# Patient Record
Sex: FEMALE | Race: WHITE | Employment: OTHER | URBAN - METROPOLITAN AREA
[De-identification: names, ages, dates, MRNs, and addresses within clinical notes are randomized per-mention and may not be internally consistent; named-entity substitution may affect disease eponyms.]

---

## 2023-03-15 LAB — MAMMOGRAPHY, EXTERNAL: NORMAL

## 2023-10-23 ENCOUNTER — TELEPHONE (OUTPATIENT)
Age: 78
End: 2023-10-23

## 2023-10-24 RX ORDER — LISINOPRIL 20 MG/1
20 TABLET ORAL DAILY
COMMUNITY
Start: 2023-08-24

## 2023-10-24 RX ORDER — SIMVASTATIN 20 MG
TABLET ORAL
COMMUNITY
Start: 2023-09-06

## 2023-10-24 RX ORDER — OMEPRAZOLE 20 MG/1
CAPSULE, DELAYED RELEASE ORAL
COMMUNITY
Start: 2023-07-27 | End: 2023-10-25 | Stop reason: SDUPTHER

## 2023-10-24 RX ORDER — HYDROCHLOROTHIAZIDE 12.5 MG/1
TABLET ORAL
COMMUNITY
Start: 2023-09-06

## 2023-10-24 NOTE — TELEPHONE ENCOUNTER
Maxx Mckinney is calling to request a refill on the following medication(s):    Last Visit Date (If Applicable):  Visit date not found    Next Visit Date:    1/3/2024    Medication Request:  Requested Prescriptions     Pending Prescriptions Disp Refills    omeprazole (PRILOSEC) 20 MG delayed release capsule 90 capsule 3     Sig: TAKE 1 CAPSULE BY MOUTH ONCE DAILY 30 MINUTES BEFORE MORNING MEAL

## 2023-10-25 RX ORDER — OMEPRAZOLE 20 MG/1
CAPSULE, DELAYED RELEASE ORAL
Qty: 90 CAPSULE | Refills: 3 | Status: SHIPPED | OUTPATIENT
Start: 2023-10-25

## 2023-11-16 NOTE — TELEPHONE ENCOUNTER
Gege Hirsch is calling to request a refill on the following medication(s):    Last Visit Date (If Applicable):  Visit date not found    Next Visit Date:    1/3/2024    Medication Request:  Requested Prescriptions     Pending Prescriptions Disp Refills    simvastatin (ZOCOR) 20 MG tablet [Pharmacy Med Name: Simvastatin 20 MG Oral Tablet] 100 tablet 2     Sig: TAKE 1 TABLET BY MOUTH ONCE  DAILY    hydroCHLOROthiazide (HYDRODIURIL) 12.5 MG tablet [Pharmacy Med Name: hydroCHLOROthiazide 12.5 MG Oral Tablet] 100 tablet 2     Sig: TAKE 1 TABLET BY MOUTH ONCE  DAILY

## 2023-11-19 RX ORDER — HYDROCHLOROTHIAZIDE 12.5 MG/1
12.5 TABLET ORAL DAILY
Qty: 100 TABLET | Refills: 2 | Status: SHIPPED | OUTPATIENT
Start: 2023-11-19

## 2023-11-19 RX ORDER — SIMVASTATIN 20 MG
20 TABLET ORAL DAILY
Qty: 100 TABLET | Refills: 2 | Status: SHIPPED | OUTPATIENT
Start: 2023-11-19

## 2023-11-28 NOTE — TELEPHONE ENCOUNTER
Jenifer Wetzel is calling to request a refill on the following medication(s):    Last Visit Date (If Applicable):  Visit date not found    Next Visit Date:    1/17/2024    Medication Request:  Requested Prescriptions     Pending Prescriptions Disp Refills    lisinopril (PRINIVIL;ZESTRIL) 20 MG tablet [Pharmacy Med Name: Lisinopril 20 MG Oral Tablet] 90 tablet 0     Sig: Take 1 tablet by mouth once daily

## 2023-12-05 DIAGNOSIS — E78.2 MIXED HYPERLIPIDEMIA: Primary | ICD-10-CM

## 2023-12-06 RX ORDER — LISINOPRIL 20 MG/1
20 TABLET ORAL DAILY
Qty: 90 TABLET | Refills: 3 | Status: SHIPPED | OUTPATIENT
Start: 2023-12-06

## 2023-12-12 NOTE — TELEPHONE ENCOUNTER
Nitish Renae is calling to request a refill on the following medication(s):    Last Visit Date (If Applicable):  Visit date not found    Next Visit Date:    1/17/2024    Medication Request:  Requested Prescriptions     Pending Prescriptions Disp Refills    lisinopril (PRINIVIL;ZESTRIL) 20 MG tablet [Pharmacy Med Name: Lisinopril 20 MG Oral Tablet] 90 tablet 0     Sig: Take 1 tablet by mouth once daily

## 2023-12-14 RX ORDER — LISINOPRIL 20 MG/1
20 TABLET ORAL DAILY
Qty: 90 TABLET | Refills: 0 | Status: SHIPPED | OUTPATIENT
Start: 2023-12-14

## 2024-01-12 LAB
CHOLESTEROL, TOTAL: 191 MG/DL
CHOLESTEROL/HDL RATIO: 4.2
HDLC SERPL-MCNC: 46 MG/DL (ref 35–70)
LDL CHOLESTEROL CALCULATED: 116 MG/DL (ref 0–160)
NONHDLC SERPL-MCNC: NORMAL MG/DL
TRIGL SERPL-MCNC: 143 MG/DL
VLDLC SERPL CALC-MCNC: 29 MG/DL

## 2024-01-17 ENCOUNTER — OFFICE VISIT (OUTPATIENT)
Age: 79
End: 2024-01-17

## 2024-01-17 VITALS
BODY MASS INDEX: 32.2 KG/M2 | SYSTOLIC BLOOD PRESSURE: 130 MMHG | HEIGHT: 60 IN | TEMPERATURE: 97.6 F | HEART RATE: 86 BPM | DIASTOLIC BLOOD PRESSURE: 80 MMHG | WEIGHT: 164 LBS | OXYGEN SATURATION: 95 %

## 2024-01-17 DIAGNOSIS — Z86.16 HISTORY OF COVID-19: ICD-10-CM

## 2024-01-17 DIAGNOSIS — N18.31 CHRONIC KIDNEY DISEASE, STAGE 3A (HCC): ICD-10-CM

## 2024-01-17 DIAGNOSIS — Z78.0 POST-MENOPAUSE: ICD-10-CM

## 2024-01-17 DIAGNOSIS — K21.9 GERD WITHOUT ESOPHAGITIS: ICD-10-CM

## 2024-01-17 DIAGNOSIS — I10 ESSENTIAL HYPERTENSION: ICD-10-CM

## 2024-01-17 DIAGNOSIS — Z12.31 BREAST CANCER SCREENING BY MAMMOGRAM: ICD-10-CM

## 2024-01-17 DIAGNOSIS — E78.2 MIXED HYPERLIPIDEMIA: ICD-10-CM

## 2024-01-17 DIAGNOSIS — Z23 IMMUNIZATION DUE: ICD-10-CM

## 2024-01-17 DIAGNOSIS — Z00.00 MEDICARE ANNUAL WELLNESS VISIT, SUBSEQUENT: Primary | ICD-10-CM

## 2024-01-17 DIAGNOSIS — E55.9 VITAMIN D DEFICIENCY: ICD-10-CM

## 2024-01-17 RX ORDER — LISINOPRIL 20 MG/1
20 TABLET ORAL DAILY
Qty: 90 TABLET | Refills: 3 | Status: SHIPPED | OUTPATIENT
Start: 2024-01-17

## 2024-01-17 RX ORDER — ACETAMINOPHEN 160 MG
1 TABLET,DISINTEGRATING ORAL DAILY
COMMUNITY

## 2024-01-17 SDOH — ECONOMIC STABILITY: FOOD INSECURITY: WITHIN THE PAST 12 MONTHS, YOU WORRIED THAT YOUR FOOD WOULD RUN OUT BEFORE YOU GOT MONEY TO BUY MORE.: NEVER TRUE

## 2024-01-17 SDOH — ECONOMIC STABILITY: HOUSING INSECURITY
IN THE LAST 12 MONTHS, WAS THERE A TIME WHEN YOU DID NOT HAVE A STEADY PLACE TO SLEEP OR SLEPT IN A SHELTER (INCLUDING NOW)?: NO

## 2024-01-17 SDOH — ECONOMIC STABILITY: FOOD INSECURITY: WITHIN THE PAST 12 MONTHS, THE FOOD YOU BOUGHT JUST DIDN'T LAST AND YOU DIDN'T HAVE MONEY TO GET MORE.: NEVER TRUE

## 2024-01-17 SDOH — ECONOMIC STABILITY: INCOME INSECURITY: HOW HARD IS IT FOR YOU TO PAY FOR THE VERY BASICS LIKE FOOD, HOUSING, MEDICAL CARE, AND HEATING?: NOT HARD AT ALL

## 2024-01-17 ASSESSMENT — PATIENT HEALTH QUESTIONNAIRE - PHQ9
1. LITTLE INTEREST OR PLEASURE IN DOING THINGS: 0
SUM OF ALL RESPONSES TO PHQ QUESTIONS 1-9: 0
SUM OF ALL RESPONSES TO PHQ9 QUESTIONS 1 & 2: 0
2. FEELING DOWN, DEPRESSED OR HOPELESS: 0

## 2024-01-17 ASSESSMENT — ENCOUNTER SYMPTOMS
ABDOMINAL PAIN: 0
WHEEZING: 0
NAUSEA: 0
CONSTIPATION: 0
SORE THROAT: 0
BLOOD IN STOOL: 0
VOMITING: 0
SINUS PRESSURE: 0
EYE PAIN: 0
SINUS PAIN: 0
SHORTNESS OF BREATH: 0
RHINORRHEA: 0
DIARRHEA: 0
COUGH: 0
EYE REDNESS: 0
BACK PAIN: 0

## 2024-01-17 ASSESSMENT — LIFESTYLE VARIABLES
HOW OFTEN DO YOU HAVE A DRINK CONTAINING ALCOHOL: NEVER
HOW MANY STANDARD DRINKS CONTAINING ALCOHOL DO YOU HAVE ON A TYPICAL DAY: PATIENT DOES NOT DRINK

## 2024-01-17 ASSESSMENT — ANXIETY QUESTIONNAIRES
6. BECOMING EASILY ANNOYED OR IRRITABLE: 0
3. WORRYING TOO MUCH ABOUT DIFFERENT THINGS: 0
4. TROUBLE RELAXING: 0
5. BEING SO RESTLESS THAT IT IS HARD TO SIT STILL: 0
1. FEELING NERVOUS, ANXIOUS, OR ON EDGE: 0
GAD7 TOTAL SCORE: 0
7. FEELING AFRAID AS IF SOMETHING AWFUL MIGHT HAPPEN: 0
2. NOT BEING ABLE TO STOP OR CONTROL WORRYING: 0

## 2024-01-17 NOTE — PROGRESS NOTES
Medicare Annual Wellness Visit    Nayana Hoang is here for Medicare AWV    Assessment & Plan   Medicare annual wellness visit, subsequent  Essential hypertension  Comments:  Blood pressure is well-controlled on HCTZ 12.5 mg daily and lisinopril 20 mg daily.  Avoid salt.  Active taking care of of her daughter w/ back surgery.  The following orders have not been finalized:  Orders:  -     lisinopril (PRINIVIL;ZESTRIL) 20 MG tablet  GERD without esophagitis  Comments:  Well-controlled on omeprazole 20 mg daily.  Drinks iced tea.  Mixed hyperlipidemia  Comments:  Lipids well-controlled on simvastatin 20 mg daily.  Low-cholesterol diet.  Repeat again in 6 months with liver function  Chronic kidney disease, stage 3a (HCC)  Comments:  Drinks lots of tea.  Monitor renal function.  Urinating well  Vitamin D deficiency  Comments:  On vitamin D3 2000 units a day.  Monitor level  Immunization due  Comments:  recommend covid and RSV  History of COVID-19  Post-menopause  Breast cancer screening by mammogram  Recommendations for Preventive Services Due: see orders and patient instructions/AVS.  Recommended screening schedule for the next 5-10 years is provided to the patient in written form: see Patient Instructions/AVS.     Return in 6 months (on 7/17/2024).     Subjective       Patient's complete Health Risk Assessment and screening values have been reviewed and are found in Flowsheets. The following problems were reviewed today and where indicated follow up appointments were made and/or referrals ordered.    Positive Risk Factor Screenings with Interventions:                Activity, Diet, and Weight:  On average, how many days per week do you engage in moderate to strenuous exercise (like a brisk walk)?: 0 days  On average, how many minutes do you engage in exercise at this level?: 0 min    Do you eat balanced/healthy meals regularly?: Yes    Body mass index is 32.03 kg/m². (!) Abnormal      Inactivity Interventions:

## 2024-01-17 NOTE — ADDENDUM NOTE
Addended by: HORACE NEWTON on: 1/17/2024 03:46 PM     Modules accepted: Orders, Level of Service

## 2024-02-16 PROBLEM — Z00.00 MEDICARE ANNUAL WELLNESS VISIT, SUBSEQUENT: Status: RESOLVED | Noted: 2024-01-17 | Resolved: 2024-02-16

## 2024-03-19 ENCOUNTER — HOSPITAL ENCOUNTER (OUTPATIENT)
Dept: MAMMOGRAPHY | Age: 79
Discharge: HOME | End: 2024-03-21
Attending: INTERNAL MEDICINE
Payer: MEDICARE

## 2024-03-19 VITALS — WEIGHT: 162 LBS | BODY MASS INDEX: 31.8 KG/M2 | HEIGHT: 60 IN

## 2024-03-19 DIAGNOSIS — Z12.31 BREAST CANCER SCREENING BY MAMMOGRAM: ICD-10-CM

## 2024-03-19 DIAGNOSIS — Z78.0 POST-MENOPAUSE: ICD-10-CM

## 2024-03-19 PROCEDURE — 77080 DXA BONE DENSITY AXIAL: CPT

## 2024-03-19 PROCEDURE — 77067 SCR MAMMO BI INCL CAD: CPT

## 2024-06-24 DIAGNOSIS — K21.9 GERD WITHOUT ESOPHAGITIS: Primary | ICD-10-CM

## 2024-06-24 RX ORDER — OMEPRAZOLE 20 MG/1
CAPSULE, DELAYED RELEASE ORAL
Qty: 100 CAPSULE | Refills: 3 | Status: SHIPPED | OUTPATIENT
Start: 2024-06-24

## 2024-06-24 NOTE — TELEPHONE ENCOUNTER
Nayana Hoang is calling to request a refill on the following medication(s):    Last Visit Date (If Applicable):  Visit date not found    Next Visit Date:    Visit date not found    Medication Request:  Requested Prescriptions     Pending Prescriptions Disp Refills    omeprazole (PRILOSEC) 20 MG delayed release capsule [Pharmacy Med Name: Omeprazole 20 MG Oral Capsule Delayed Release] 100 capsule 2     Sig: TAKE 1 CAPSULE BY MOUTH ONCE  DAILY 1/2 HOUR BEFORE BREAKFAST

## 2024-07-25 DIAGNOSIS — E55.9 VITAMIN D DEFICIENCY: ICD-10-CM

## 2024-07-29 ENCOUNTER — OFFICE VISIT (OUTPATIENT)
Age: 79
End: 2024-07-29
Payer: MEDICARE

## 2024-07-29 VITALS
TEMPERATURE: 97 F | DIASTOLIC BLOOD PRESSURE: 86 MMHG | HEART RATE: 86 BPM | SYSTOLIC BLOOD PRESSURE: 130 MMHG | WEIGHT: 165 LBS | OXYGEN SATURATION: 94 % | HEIGHT: 60 IN | BODY MASS INDEX: 32.39 KG/M2

## 2024-07-29 DIAGNOSIS — Z86.16 HISTORY OF COVID-19: ICD-10-CM

## 2024-07-29 DIAGNOSIS — E55.9 VITAMIN D DEFICIENCY: ICD-10-CM

## 2024-07-29 DIAGNOSIS — K21.9 GERD WITHOUT ESOPHAGITIS: ICD-10-CM

## 2024-07-29 DIAGNOSIS — I10 ESSENTIAL HYPERTENSION: Primary | ICD-10-CM

## 2024-07-29 DIAGNOSIS — E78.2 MIXED HYPERLIPIDEMIA: ICD-10-CM

## 2024-07-29 DIAGNOSIS — N18.31 CHRONIC KIDNEY DISEASE, STAGE 3A (HCC): ICD-10-CM

## 2024-07-29 PROCEDURE — 3079F DIAST BP 80-89 MM HG: CPT | Performed by: INTERNAL MEDICINE

## 2024-07-29 PROCEDURE — 3075F SYST BP GE 130 - 139MM HG: CPT | Performed by: INTERNAL MEDICINE

## 2024-07-29 PROCEDURE — 1123F ACP DISCUSS/DSCN MKR DOCD: CPT | Performed by: INTERNAL MEDICINE

## 2024-07-29 PROCEDURE — 99214 OFFICE O/P EST MOD 30 MIN: CPT | Performed by: INTERNAL MEDICINE

## 2024-07-29 RX ORDER — OMEPRAZOLE 20 MG/1
CAPSULE, DELAYED RELEASE ORAL
Qty: 100 CAPSULE | Refills: 3 | Status: SHIPPED | OUTPATIENT
Start: 2024-07-29

## 2024-07-29 ASSESSMENT — ENCOUNTER SYMPTOMS
COUGH: 0
BLOOD IN STOOL: 0
SORE THROAT: 0
EYE PAIN: 0
EYE REDNESS: 0
BACK PAIN: 0
SINUS PRESSURE: 0
SHORTNESS OF BREATH: 0
RHINORRHEA: 0
NAUSEA: 0
WHEEZING: 0
ABDOMINAL PAIN: 0
VOMITING: 0
CONSTIPATION: 0
DIARRHEA: 0
SINUS PAIN: 0

## 2024-07-29 NOTE — PROGRESS NOTES
esophagitis  -     omeprazole (PRILOSEC) 20 MG delayed release capsule; TAKE 1 CAPSULE BY MOUTH ONCE  DAILY 1/2 HOUR BEFORE BREAKFAST, Disp-100 capsule, R-3Please send a replace/new response with 100-Day Supply if appropriate to maximize member benefit. Requesting 1 year supply.Normal  7. History of COVID-19  Comments:  To prior episodes.  No long-term sequelae.  Recommend COVID-vaccine in the fall.       Return in about 6 months (around 1/29/2025).    COMMUNICATION:       Electronically signed by Elder Irvin MD on 7/30/2024 at 3:43 PM

## 2024-09-13 ENCOUNTER — OFFICE VISIT (OUTPATIENT)
Age: 79
End: 2024-09-13

## 2024-09-13 VITALS
DIASTOLIC BLOOD PRESSURE: 86 MMHG | OXYGEN SATURATION: 95 % | HEIGHT: 60 IN | WEIGHT: 162 LBS | SYSTOLIC BLOOD PRESSURE: 124 MMHG | BODY MASS INDEX: 31.8 KG/M2 | HEART RATE: 73 BPM | TEMPERATURE: 96.9 F

## 2024-09-13 DIAGNOSIS — K21.9 GERD WITHOUT ESOPHAGITIS: ICD-10-CM

## 2024-09-13 DIAGNOSIS — N18.31 CHRONIC KIDNEY DISEASE, STAGE 3A (HCC): ICD-10-CM

## 2024-09-13 DIAGNOSIS — Z86.16 HISTORY OF COVID-19: ICD-10-CM

## 2024-09-13 DIAGNOSIS — Z86.010 HISTORY OF COLON POLYPS: ICD-10-CM

## 2024-09-13 DIAGNOSIS — I10 ESSENTIAL HYPERTENSION: ICD-10-CM

## 2024-09-13 DIAGNOSIS — R10.11 RUQ ABDOMINAL PAIN: Primary | ICD-10-CM

## 2024-09-13 DIAGNOSIS — E78.2 MIXED HYPERLIPIDEMIA: ICD-10-CM

## 2024-09-13 DIAGNOSIS — E55.9 VITAMIN D DEFICIENCY: ICD-10-CM

## 2024-09-13 ASSESSMENT — ENCOUNTER SYMPTOMS
BLOOD IN STOOL: 0
SINUS PRESSURE: 0
SINUS PAIN: 0
VOMITING: 0
SORE THROAT: 0
WHEEZING: 0
CONSTIPATION: 0
NAUSEA: 0
EYE REDNESS: 0
RHINORRHEA: 0
BACK PAIN: 0
SHORTNESS OF BREATH: 0
DIARRHEA: 0
COUGH: 0
ABDOMINAL PAIN: 0
EYE PAIN: 0

## 2024-10-06 DIAGNOSIS — I10 ESSENTIAL HYPERTENSION: ICD-10-CM

## 2024-10-07 NOTE — TELEPHONE ENCOUNTER
Nayana Hoang is calling to request a refill on the following medication(s):    Last Visit Date (If Applicable):  9/13/2024    Next Visit Date:    01/30/2025    Medication Request:  Requested Prescriptions     Pending Prescriptions Disp Refills    hydroCHLOROthiazide 12.5 MG tablet [Pharmacy Med Name: hydroCHLOROthiazide 12.5 MG Oral Tablet] 100 tablet 2     Sig: TAKE 1 TABLET BY MOUTH ONCE  DAILY    simvastatin (ZOCOR) 20 MG tablet [Pharmacy Med Name: Simvastatin 20 MG Oral Tablet] 100 tablet 2     Sig: TAKE 1 TABLET BY MOUTH ONCE  DAILY    lisinopril (PRINIVIL;ZESTRIL) 20 MG tablet [Pharmacy Med Name: Lisinopril 20 MG Oral Tablet] 100 tablet 2     Sig: TAKE 1 TABLET BY MOUTH DAILY

## 2024-10-08 RX ORDER — HYDROCHLOROTHIAZIDE 12.5 MG/1
12.5 TABLET ORAL DAILY
Qty: 90 TABLET | Refills: 3 | Status: SHIPPED | OUTPATIENT
Start: 2024-10-08

## 2024-10-08 RX ORDER — SIMVASTATIN 20 MG
20 TABLET ORAL DAILY
Qty: 90 TABLET | Refills: 3 | Status: SHIPPED | OUTPATIENT
Start: 2024-10-08

## 2024-10-08 RX ORDER — LISINOPRIL 20 MG/1
20 TABLET ORAL DAILY
Qty: 90 TABLET | Refills: 3 | Status: SHIPPED | OUTPATIENT
Start: 2024-10-08

## 2025-01-27 DIAGNOSIS — N18.31 CHRONIC KIDNEY DISEASE, STAGE 3A (HCC): ICD-10-CM

## 2025-01-27 DIAGNOSIS — E78.2 MIXED HYPERLIPIDEMIA: ICD-10-CM

## 2025-01-30 ENCOUNTER — OFFICE VISIT (OUTPATIENT)
Age: 80
End: 2025-01-30

## 2025-01-30 VITALS
DIASTOLIC BLOOD PRESSURE: 80 MMHG | SYSTOLIC BLOOD PRESSURE: 125 MMHG | OXYGEN SATURATION: 93 % | HEIGHT: 60 IN | TEMPERATURE: 97 F | WEIGHT: 165.6 LBS | BODY MASS INDEX: 32.51 KG/M2 | HEART RATE: 94 BPM

## 2025-01-30 DIAGNOSIS — E78.2 MIXED HYPERLIPIDEMIA: ICD-10-CM

## 2025-01-30 DIAGNOSIS — E55.9 VITAMIN D DEFICIENCY: ICD-10-CM

## 2025-01-30 DIAGNOSIS — Z00.00 MEDICARE ANNUAL WELLNESS VISIT, SUBSEQUENT: Primary | ICD-10-CM

## 2025-01-30 DIAGNOSIS — Z12.31 BREAST CANCER SCREENING BY MAMMOGRAM: ICD-10-CM

## 2025-01-30 DIAGNOSIS — I10 ESSENTIAL HYPERTENSION: ICD-10-CM

## 2025-01-30 DIAGNOSIS — N18.31 CHRONIC KIDNEY DISEASE, STAGE 3A (HCC): ICD-10-CM

## 2025-01-30 DIAGNOSIS — K21.9 GERD WITHOUT ESOPHAGITIS: ICD-10-CM

## 2025-01-30 SDOH — ECONOMIC STABILITY: FOOD INSECURITY: WITHIN THE PAST 12 MONTHS, YOU WORRIED THAT YOUR FOOD WOULD RUN OUT BEFORE YOU GOT MONEY TO BUY MORE.: NEVER TRUE

## 2025-01-30 SDOH — ECONOMIC STABILITY: FOOD INSECURITY: WITHIN THE PAST 12 MONTHS, THE FOOD YOU BOUGHT JUST DIDN'T LAST AND YOU DIDN'T HAVE MONEY TO GET MORE.: NEVER TRUE

## 2025-01-30 ASSESSMENT — ENCOUNTER SYMPTOMS
VOMITING: 0
EYE PAIN: 0
ABDOMINAL PAIN: 0
COUGH: 0
BACK PAIN: 0
SORE THROAT: 0
NAUSEA: 0
SINUS PAIN: 0
SINUS PRESSURE: 0
EYE REDNESS: 0
DIARRHEA: 0
WHEEZING: 0
RHINORRHEA: 0
SHORTNESS OF BREATH: 0
BLOOD IN STOOL: 0
CONSTIPATION: 0

## 2025-01-30 ASSESSMENT — PATIENT HEALTH QUESTIONNAIRE - PHQ9
SUM OF ALL RESPONSES TO PHQ QUESTIONS 1-9: 0
SUM OF ALL RESPONSES TO PHQ9 QUESTIONS 1 & 2: 0
SUM OF ALL RESPONSES TO PHQ QUESTIONS 1-9: 0
1. LITTLE INTEREST OR PLEASURE IN DOING THINGS: NOT AT ALL
SUM OF ALL RESPONSES TO PHQ QUESTIONS 1-9: 0
2. FEELING DOWN, DEPRESSED OR HOPELESS: NOT AT ALL
SUM OF ALL RESPONSES TO PHQ QUESTIONS 1-9: 0

## 2025-01-30 ASSESSMENT — LIFESTYLE VARIABLES
HOW MANY STANDARD DRINKS CONTAINING ALCOHOL DO YOU HAVE ON A TYPICAL DAY: PATIENT DOES NOT DRINK
HOW OFTEN DO YOU HAVE A DRINK CONTAINING ALCOHOL: NEVER

## 2025-01-30 NOTE — PROGRESS NOTES
Medicare Annual Wellness Visit    Nayana Hoang is here for Medicare AWV    Assessment & Plan   Medicare annual wellness visit, subsequent  Chart reviewed.  H&P done.  Medications reviewed.  Recent testing reviewed.  Vaccines reviewed.  Patient can get caught up on Tdap, shingles, RSV all through pharmacy.  Essential hypertension  Blood pressure stable and well-controlled with lisinopril 20 mg daily and HCTZ 12.5 mg daily no changes.  Chronic kidney disease, stage 3a (HCC)  -     Comprehensive Metabolic Panel; Future  GFR improved from 48 all the way to 58 from last July.  No changes in medication.  Continue to drink water.  GERD without esophagitis  GERD symptoms are controlled with omeprazole 20 mg daily.  No changes  Vitamin D deficiency  -     Vitamin D 25 Hydroxy; Future  Continue over-the-counter vitamin D 1000 units daily.  Mixed hyperlipidemia  -     Lipid Panel; Future  Lipids reviewed.  Encouraged more exercise but otherwise well-controlled with simvastatin 20 mg daily.  Breast cancer screening by mammogram  -     [facility] DIGITAL SCREEN W OR WO CAD BILATERAL; Future     Return in about 6 months (around 7/30/2025) for f/u HTN.     Subjective       Patient's complete Health Risk Assessment and screening values have been reviewed and are found in Flowsheets. The following problems were reviewed today and where indicated follow up appointments were made and/or referrals ordered.    Positive Risk Factor Screenings with Interventions:              Inactivity:  On average, how many days per week do you engage in moderate to strenuous exercise (like a brisk walk)?: 0 days (!) Abnormal  On average, how many minutes do you engage in exercise at this level?: 0 min  Interventions:  See A/P for plan and any pertinent orders     Abnormal BMI (obese):  Body mass index is 32.34 kg/m². (!) Abnormal  Interventions:  See A/P for plan and any pertinent orders                 Review of Systems   Constitutional:  Negative for

## 2025-03-21 ENCOUNTER — RESULTS FOLLOW-UP (OUTPATIENT)
Dept: MAMMOGRAPHY | Age: 80
End: 2025-03-21

## 2025-03-21 ENCOUNTER — HOSPITAL ENCOUNTER (OUTPATIENT)
Dept: MAMMOGRAPHY | Age: 80
Discharge: HOME | End: 2025-03-23
Payer: MEDICARE

## 2025-03-21 VITALS — BODY MASS INDEX: 32.22 KG/M2 | WEIGHT: 165 LBS

## 2025-03-21 DIAGNOSIS — Z12.31 BREAST CANCER SCREENING BY MAMMOGRAM: ICD-10-CM

## 2025-03-21 PROCEDURE — 77063 BREAST TOMOSYNTHESIS BI: CPT

## 2025-07-29 LAB
ALBUMIN: NORMAL
ALP BLD-CCNC: NORMAL U/L
ALT SERPL-CCNC: NORMAL U/L
ANION GAP SERPL CALCULATED.3IONS-SCNC: NORMAL MMOL/L
AST SERPL-CCNC: NORMAL U/L
BILIRUB SERPL-MCNC: NORMAL MG/DL
BUN BLDV-MCNC: NORMAL MG/DL
CALCIUM SERPL-MCNC: NORMAL MG/DL
CHLORIDE BLD-SCNC: NORMAL MMOL/L
CHOLESTEROL, TOTAL: 161 MG/DL
CHOLESTEROL/HDL RATIO: 4.4
CO2: NORMAL
CREAT SERPL-MCNC: NORMAL MG/DL
GFR, ESTIMATED: NORMAL
GLUCOSE BLD-MCNC: NORMAL MG/DL
HDLC SERPL-MCNC: 37 MG/DL (ref 35–70)
LDL CHOLESTEROL: 91
NONHDLC SERPL-MCNC: NORMAL MG/DL
POTASSIUM SERPL-SCNC: NORMAL MMOL/L
SODIUM BLD-SCNC: NORMAL MMOL/L
TOTAL PROTEIN: NORMAL
TRIGL SERPL-MCNC: 163 MG/DL
VLDLC SERPL CALC-MCNC: 33 MG/DL

## 2025-07-30 DIAGNOSIS — E55.9 VITAMIN D DEFICIENCY: ICD-10-CM

## 2025-07-30 DIAGNOSIS — N18.31 CHRONIC KIDNEY DISEASE, STAGE 3A (HCC): ICD-10-CM

## 2025-07-30 DIAGNOSIS — E78.2 MIXED HYPERLIPIDEMIA: ICD-10-CM

## 2025-07-31 DIAGNOSIS — I10 ESSENTIAL HYPERTENSION: ICD-10-CM

## 2025-07-31 RX ORDER — LISINOPRIL 20 MG/1
20 TABLET ORAL DAILY
Qty: 90 TABLET | Refills: 3 | Status: SHIPPED | OUTPATIENT
Start: 2025-07-31

## 2025-07-31 RX ORDER — HYDROCHLOROTHIAZIDE 12.5 MG/1
12.5 TABLET ORAL DAILY
Qty: 90 TABLET | Refills: 3 | Status: SHIPPED | OUTPATIENT
Start: 2025-07-31

## 2025-07-31 RX ORDER — SIMVASTATIN 20 MG
20 TABLET ORAL DAILY
Qty: 90 TABLET | Refills: 3 | Status: SHIPPED | OUTPATIENT
Start: 2025-07-31

## 2025-07-31 NOTE — TELEPHONE ENCOUNTER
Nayana Hoang is calling to request a refill on the following medication(s):    Last Visit Date (If Applicable):  1/30/2025    Next Visit Date:    08/05/2025    Medication Request:  Requested Prescriptions     Pending Prescriptions Disp Refills    hydroCHLOROthiazide 12.5 MG tablet [Pharmacy Med Name: hydroCHLOROthiazide 12.5 MG Oral Tablet] 100 tablet 2     Sig: Take 1 tablet by mouth daily    lisinopril (PRINIVIL;ZESTRIL) 20 MG tablet [Pharmacy Med Name: Lisinopril 20 MG Oral Tablet] 100 tablet 2     Sig: Take 1 tablet by mouth daily    simvastatin (ZOCOR) 20 MG tablet [Pharmacy Med Name: Simvastatin 20 MG Oral Tablet] 100 tablet 2     Sig: Take 1 tablet by mouth daily

## 2025-08-04 ENCOUNTER — HOSPITAL ENCOUNTER (OUTPATIENT)
Age: 80
Setting detail: SPECIMEN
Discharge: HOME | End: 2025-08-04

## 2025-08-04 ENCOUNTER — HOSPITAL ENCOUNTER (OUTPATIENT)
Dept: CT IMAGING | Age: 80
Discharge: HOME | End: 2025-08-06
Payer: MEDICARE

## 2025-08-04 ENCOUNTER — OFFICE VISIT (OUTPATIENT)
Age: 80
End: 2025-08-04

## 2025-08-04 VITALS
BODY MASS INDEX: 32.08 KG/M2 | HEIGHT: 60 IN | TEMPERATURE: 97 F | OXYGEN SATURATION: 97 % | WEIGHT: 163.4 LBS | SYSTOLIC BLOOD PRESSURE: 138 MMHG | HEART RATE: 87 BPM | DIASTOLIC BLOOD PRESSURE: 84 MMHG

## 2025-08-04 DIAGNOSIS — E78.2 MIXED HYPERLIPIDEMIA: ICD-10-CM

## 2025-08-04 DIAGNOSIS — R10.31 RIGHT LOWER QUADRANT ABDOMINAL PAIN: ICD-10-CM

## 2025-08-04 DIAGNOSIS — R31.29 OTHER MICROSCOPIC HEMATURIA: ICD-10-CM

## 2025-08-04 DIAGNOSIS — N30.90 CYSTITIS: ICD-10-CM

## 2025-08-04 DIAGNOSIS — I10 ESSENTIAL HYPERTENSION: ICD-10-CM

## 2025-08-04 DIAGNOSIS — N18.31 CHRONIC KIDNEY DISEASE, STAGE 3A (HCC): ICD-10-CM

## 2025-08-04 DIAGNOSIS — R10.31 RIGHT LOWER QUADRANT ABDOMINAL PAIN: Primary | ICD-10-CM

## 2025-08-04 DIAGNOSIS — K21.9 GERD WITHOUT ESOPHAGITIS: ICD-10-CM

## 2025-08-04 LAB
BILIRUBIN, POC: NORMAL
BLOOD URINE, POC: NORMAL
CLARITY, POC: CLEAR
COLOR, POC: YELLOW
GLUCOSE URINE, POC: NORMAL MG/DL
KETONES, POC: NORMAL MG/DL
LEUKOCYTE EST, POC: NORMAL
NITRITE, POC: NORMAL
PH, POC: 7
PROTEIN, POC: NORMAL MG/DL
SPECIFIC GRAVITY, POC: 1.01
UROBILINOGEN, POC: 0.2 MG/DL

## 2025-08-04 PROCEDURE — 74176 CT ABD & PELVIS W/O CONTRAST: CPT

## 2025-08-04 RX ORDER — AMOXICILLIN 500 MG/1
500 CAPSULE ORAL 3 TIMES DAILY
Qty: 30 CAPSULE | Refills: 0 | Status: SHIPPED | OUTPATIENT
Start: 2025-08-04 | End: 2025-08-14

## 2025-08-04 ASSESSMENT — ENCOUNTER SYMPTOMS
RHINORRHEA: 0
NAUSEA: 0
SINUS PRESSURE: 0
SINUS PAIN: 0
VOMITING: 0
SHORTNESS OF BREATH: 0
EYE REDNESS: 0
DIARRHEA: 0
COUGH: 0
CONSTIPATION: 0
EYE PAIN: 0
BLOOD IN STOOL: 0
SORE THROAT: 0
ABDOMINAL PAIN: 1
BACK PAIN: 0
WHEEZING: 0

## 2025-08-05 LAB
MICROORGANISM SPEC CULT: NO GROWTH
SERVICE CMNT-IMP: NORMAL
SPECIMEN DESCRIPTION: NORMAL

## 2025-08-12 DIAGNOSIS — K21.9 GERD WITHOUT ESOPHAGITIS: ICD-10-CM

## 2025-08-12 RX ORDER — OMEPRAZOLE 20 MG/1
CAPSULE, DELAYED RELEASE ORAL
Qty: 100 CAPSULE | Refills: 3 | Status: SHIPPED | OUTPATIENT
Start: 2025-08-12

## 2025-08-14 ENCOUNTER — OFFICE VISIT (OUTPATIENT)
Age: 80
End: 2025-08-14

## 2025-08-14 VITALS
TEMPERATURE: 96.8 F | DIASTOLIC BLOOD PRESSURE: 70 MMHG | HEIGHT: 60 IN | OXYGEN SATURATION: 95 % | WEIGHT: 163 LBS | HEART RATE: 84 BPM | SYSTOLIC BLOOD PRESSURE: 140 MMHG | BODY MASS INDEX: 32 KG/M2

## 2025-08-14 DIAGNOSIS — K40.20 BILATERAL INGUINAL HERNIA WITHOUT OBSTRUCTION OR GANGRENE, RECURRENCE NOT SPECIFIED: ICD-10-CM

## 2025-08-14 DIAGNOSIS — N30.90 CYSTITIS: ICD-10-CM

## 2025-08-14 DIAGNOSIS — K21.9 GERD WITHOUT ESOPHAGITIS: ICD-10-CM

## 2025-08-14 DIAGNOSIS — N81.10 FEMALE CYSTOCELE: ICD-10-CM

## 2025-08-14 DIAGNOSIS — I10 ESSENTIAL HYPERTENSION: Primary | ICD-10-CM

## 2025-08-14 ASSESSMENT — ENCOUNTER SYMPTOMS
ABDOMINAL PAIN: 0
EYE REDNESS: 0
SORE THROAT: 0
SHORTNESS OF BREATH: 0
WHEEZING: 0
SINUS PRESSURE: 0
EYE PAIN: 0
SINUS PAIN: 0
BACK PAIN: 0
BLOOD IN STOOL: 0
NAUSEA: 0
CONSTIPATION: 0
COUGH: 0
DIARRHEA: 0
RHINORRHEA: 0
VOMITING: 0

## 2025-08-20 ENCOUNTER — HOSPITAL ENCOUNTER (OUTPATIENT)
Age: 80
Discharge: HOME | End: 2025-08-22
Payer: MEDICARE

## 2025-08-20 ENCOUNTER — OFFICE VISIT (OUTPATIENT)
Age: 80
End: 2025-08-20

## 2025-08-20 ENCOUNTER — HOSPITAL ENCOUNTER (OUTPATIENT)
Age: 80
Setting detail: SPECIMEN
Discharge: HOME | End: 2025-08-20

## 2025-08-20 VITALS
SYSTOLIC BLOOD PRESSURE: 140 MMHG | BODY MASS INDEX: 31.83 KG/M2 | HEART RATE: 88 BPM | OXYGEN SATURATION: 94 % | TEMPERATURE: 96.9 F | HEIGHT: 60 IN | DIASTOLIC BLOOD PRESSURE: 88 MMHG

## 2025-08-20 DIAGNOSIS — K40.20 BILATERAL INGUINAL HERNIA WITHOUT OBSTRUCTION OR GANGRENE, RECURRENCE NOT SPECIFIED: ICD-10-CM

## 2025-08-20 DIAGNOSIS — N30.90 CYSTITIS: ICD-10-CM

## 2025-08-20 DIAGNOSIS — M25.551 RIGHT HIP PAIN: ICD-10-CM

## 2025-08-20 DIAGNOSIS — R10.31 RIGHT LOWER QUADRANT ABDOMINAL PAIN: Primary | ICD-10-CM

## 2025-08-20 DIAGNOSIS — I10 ESSENTIAL HYPERTENSION: ICD-10-CM

## 2025-08-20 LAB
BILIRUBIN, POC: NORMAL
BLOOD URINE, POC: NORMAL
CLARITY, POC: CLEAR
COLOR, POC: YELLOW
GLUCOSE URINE, POC: NORMAL MG/DL
KETONES, POC: NORMAL MG/DL
LEUKOCYTE EST, POC: NORMAL
NITRITE, POC: NORMAL
PH, POC: 5.5
PROTEIN, POC: 30 MG/DL
SPECIFIC GRAVITY, POC: 1.01
UROBILINOGEN, POC: 0.2 MG/DL

## 2025-08-20 PROCEDURE — 72100 X-RAY EXAM L-S SPINE 2/3 VWS: CPT

## 2025-08-20 PROCEDURE — 73502 X-RAY EXAM HIP UNI 2-3 VIEWS: CPT

## 2025-08-20 RX ORDER — CIPROFLOXACIN 500 MG/1
500 TABLET, FILM COATED ORAL 2 TIMES DAILY
Qty: 20 TABLET | Refills: 0 | Status: SHIPPED | OUTPATIENT
Start: 2025-08-20 | End: 2025-08-30

## 2025-08-20 ASSESSMENT — ENCOUNTER SYMPTOMS
SORE THROAT: 0
VOMITING: 0
NAUSEA: 0
DIARRHEA: 0
CONSTIPATION: 0
COUGH: 0
ABDOMINAL PAIN: 1
SINUS PAIN: 0
SINUS PRESSURE: 0
RHINORRHEA: 0
WHEEZING: 0
EYE REDNESS: 0
EYE PAIN: 0
SHORTNESS OF BREATH: 0
BACK PAIN: 1
BLOOD IN STOOL: 0

## 2025-08-22 LAB
MICROORGANISM SPEC CULT: ABNORMAL
MICROORGANISM SPEC CULT: ABNORMAL
SERVICE CMNT-IMP: ABNORMAL
SPECIMEN DESCRIPTION: ABNORMAL

## 2025-08-26 ENCOUNTER — TELEPHONE (OUTPATIENT)
Age: 80
End: 2025-08-26

## 2025-08-27 ENCOUNTER — OFFICE VISIT (OUTPATIENT)
Age: 80
End: 2025-08-27

## 2025-08-27 VITALS
BODY MASS INDEX: 32.2 KG/M2 | DIASTOLIC BLOOD PRESSURE: 90 MMHG | WEIGHT: 164 LBS | OXYGEN SATURATION: 97 % | SYSTOLIC BLOOD PRESSURE: 138 MMHG | TEMPERATURE: 96.8 F | HEART RATE: 82 BPM | HEIGHT: 60 IN

## 2025-08-27 DIAGNOSIS — I10 ESSENTIAL HYPERTENSION: ICD-10-CM

## 2025-08-27 DIAGNOSIS — K21.9 GERD WITHOUT ESOPHAGITIS: ICD-10-CM

## 2025-08-27 DIAGNOSIS — N18.31 CHRONIC KIDNEY DISEASE, STAGE 3A (HCC): ICD-10-CM

## 2025-08-27 DIAGNOSIS — M16.0 PRIMARY OSTEOARTHRITIS OF BOTH HIPS: ICD-10-CM

## 2025-08-27 DIAGNOSIS — Z86.0100 HISTORY OF COLON POLYPS: ICD-10-CM

## 2025-08-27 DIAGNOSIS — E78.2 MIXED HYPERLIPIDEMIA: ICD-10-CM

## 2025-08-27 DIAGNOSIS — E88.09 HYPERPROTEINEMIA: ICD-10-CM

## 2025-08-27 DIAGNOSIS — E55.9 VITAMIN D DEFICIENCY: ICD-10-CM

## 2025-08-27 DIAGNOSIS — K40.20 BILATERAL INGUINAL HERNIA WITHOUT OBSTRUCTION OR GANGRENE, RECURRENCE NOT SPECIFIED: ICD-10-CM

## 2025-08-27 DIAGNOSIS — M47.816 SPONDYLOSIS OF LUMBAR SPINE: ICD-10-CM

## 2025-08-27 DIAGNOSIS — N30.90 CYSTITIS: Primary | ICD-10-CM

## 2025-08-27 RX ORDER — HYDROCHLOROTHIAZIDE 25 MG/1
25 TABLET ORAL EVERY MORNING
Qty: 90 TABLET | Refills: 1 | Status: SHIPPED | OUTPATIENT
Start: 2025-08-27

## 2025-08-27 ASSESSMENT — ENCOUNTER SYMPTOMS
EYE PAIN: 0
BLOOD IN STOOL: 0
DIARRHEA: 0
WHEEZING: 0
COUGH: 0
CONSTIPATION: 0
VOMITING: 0
SORE THROAT: 0
BACK PAIN: 0
NAUSEA: 0
SHORTNESS OF BREATH: 0
ABDOMINAL PAIN: 0
RHINORRHEA: 0
EYE REDNESS: 0
SINUS PRESSURE: 0
SINUS PAIN: 0

## 2025-09-03 ENCOUNTER — HOSPITAL ENCOUNTER (OUTPATIENT)
Age: 80
Setting detail: SPECIMEN
Discharge: HOME | End: 2025-09-03

## 2025-09-03 DIAGNOSIS — E88.09 HYPERPROTEINEMIA: ICD-10-CM

## 2025-09-03 LAB
Lab: NORMAL
M SPIKE 1, URINE: NORMAL G/DL
M SPIKE 2, URINE: NORMAL G/DL
M-SPIKE 1,%: NORMAL %
M-SPIKE 2 %: NORMAL %
P E INTERPRETATION, U: NORMAL
PATHOLOGIST: NORMAL
SPECIMEN TYPE: NORMAL
URINE TOTAL PROTEIN: 203 MG/DL
VOLUME: NORMAL

## 2025-09-04 LAB
ITYP INTERPRETATION: NORMAL
PATHOLOGIST REVIEW: NORMAL
SPECIMEN TYPE: NORMAL
SPECIMEN VOL UR: NORMAL ML
TOTAL PROTEIN, URINE: 209 MG/DL